# Patient Record
Sex: MALE | Race: BLACK OR AFRICAN AMERICAN | NOT HISPANIC OR LATINO | Employment: UNEMPLOYED | URBAN - METROPOLITAN AREA
[De-identification: names, ages, dates, MRNs, and addresses within clinical notes are randomized per-mention and may not be internally consistent; named-entity substitution may affect disease eponyms.]

---

## 2017-03-09 PROBLEM — R76.8 HEPATITIS C ANTIBODY POSITIVE IN BLOOD: Status: ACTIVE | Noted: 2017-03-09

## 2017-03-09 PROBLEM — F10.10 ETOH ABUSE: Status: ACTIVE | Noted: 2017-03-09

## 2017-03-09 PROBLEM — Z12.11 SCREENING FOR COLON CANCER: Status: ACTIVE | Noted: 2017-03-09

## 2017-04-11 PROBLEM — Z12.11 ENCOUNTER FOR SCREENING COLONOSCOPY: Status: ACTIVE | Noted: 2017-04-11

## 2018-04-23 ENCOUNTER — TELEPHONE (OUTPATIENT)
Dept: ADMINISTRATIVE | Facility: HOSPITAL | Age: 58
End: 2018-04-23

## 2018-04-24 ENCOUNTER — TELEPHONE (OUTPATIENT)
Dept: ADMINISTRATIVE | Facility: HOSPITAL | Age: 58
End: 2018-04-24

## 2018-09-06 PROBLEM — B18.2 CHRONIC HEPATITIS C WITHOUT HEPATIC COMA: Status: ACTIVE | Noted: 2017-03-09

## 2019-11-19 ENCOUNTER — TELEPHONE (OUTPATIENT)
Dept: PHARMACY | Facility: CLINIC | Age: 59
End: 2019-11-19

## 2019-11-19 NOTE — TELEPHONE ENCOUNTER
LVM for callback to inform patient that [facility] received prescription for Epclusa and benefits investigation is required.  [facility] will be back in touch once insurance determination is received.

## 2019-12-06 ENCOUNTER — TELEPHONE (OUTPATIENT)
Dept: PHARMACY | Facility: CLINIC | Age: 59
End: 2019-12-06

## 2019-12-06 NOTE — TELEPHONE ENCOUNTER
Initial Epclusa Consultation attempted (attempt 1). NA, LVM for call back. Will f/u if no call back.

## 2019-12-10 NOTE — TELEPHONE ENCOUNTER
Initial Epclusa Consultation attempted (attempt 2). NA, LVM for call back. Will f/u if no call back.

## 2019-12-20 NOTE — TELEPHONE ENCOUNTER
Initial Epclusa Consultation attempted (attempt 3). No answer. Called both numbers on file. LVM for call back. Will f/u if no call back.  - Will notify provider (Dorcas Brito) and mail postcard to address on file if no answer on next attempt.

## 2020-01-02 NOTE — TELEPHONE ENCOUNTER
Initial Epclusa Consultation attempted (attempt 4). NA, LVM for call back. Will f/u in 1 week if no call back.   - [facility] will send postcard to address on file and notify provider of lack of contact.

## 2020-01-09 NOTE — TELEPHONE ENCOUNTER
Initial Epclusa Consultation attempted (attempt 5). NA on both #s on file, LVM for call back. Will f/u in 1 week if no call back.   - [facility] sent postcard to address on file and notified provider of lack of contact on 1/2.

## 2020-01-16 NOTE — TELEPHONE ENCOUNTER
Initial Epclusa Consultation attempted (attempt 6). NA on both #s on file, LVM for call back.   - [facility] sent postcard to address on file and notified provider of lack of contact on 1/2.

## 2020-02-13 ENCOUNTER — TELEPHONE (OUTPATIENT)
Dept: PHARMACY | Facility: CLINIC | Age: 60
End: 2020-02-13

## 2020-02-13 NOTE — TELEPHONE ENCOUNTER
Initial AG Epclusa consult completed on . AG Epclusa will be shipped on  to arrive at patient's home on  via [employer]. $0.00 copay. Patient will start AG Epclusa on . Address confirmed. Confirmed 2 patient identifiers - name and . Therapy Appropriate.     AG Epclusa 400/100mg- Take one tablet by mouth daily x 12 weeks  Counseling was reviewed:   1. Patient MUST take AG Epclusa at the SAME time every day.   2. Patient MUST avoid acid reducers without consulting with myself or provider first. Antacids are to be spaced out at least 4 hours apart from Epclusa.     3. Potential Side effects include: headaches and fatigue.   Headache: Patient may treat with OTC remedies. If Tylenol is used, dose should not exceed 2000mg per day.    4. Medication list reviewed. No DDIs or allergies noted. Patient MUST contact myself or provider prior to starting any new OTC, herbal, or prescription drugs to avoid potential DDIs.    DDI: Medication list reviewed and potential DDIs addressed.  AG Epclusa and Lipitor: Increased risk of myalgia. Pt will monitor for new onset muscle pain/weakness. Safe to continue on Lipitor 10mg during treatment. Pt aware dose cannot be increased.     Discussed the importance of staying well hydrated while on therapy. Compliance stressed - patient to take missed doses as soon as remembered, but NOT to take 2 doses in one day. Patient will report questions or concerns to myself or practitioner. Patient verbalizes understanding. Patient plans to start AG Epclusa on .  Consultation included: indication; goals of treatment; administration; storage and handling; side effects; how to handle side effects; the importance of compliance; how to handle missed doses; the importance of laboratory monitoring; the importance of keeping all follow up appointments.  Patient understands to report any medication changes to [facility] and provider. All questions answered and addressed to patients satisfaction. I will  f/u with him in 1 week from start, [facility] to contact patient in 3 weeks for refills.

## 2020-02-26 ENCOUNTER — TELEPHONE (OUTPATIENT)
Dept: PHARMACY | Facility: CLINIC | Age: 60
End: 2020-02-26

## 2020-02-26 NOTE — TELEPHONE ENCOUNTER
Initial touch base attempted for Epclusa. NA, LVM for call back. Will f/u at refill call if no call back.

## 2020-03-10 ENCOUNTER — TELEPHONE (OUTPATIENT)
Dept: PHARMACY | Facility: CLINIC | Age: 60
End: 2020-03-10

## 2020-03-10 NOTE — TELEPHONE ENCOUNTER
Refill readiness for Epclusa confirmed with patient; name/ confirmed; no missed doses; no new medications; no side effects noted; address confirmed for 3/12 shipment and 3/13 delivery.  Patient states they have 10 doses remaining.     JOHANNE Chatman.Ph., AAHIVP  Clinical Pharmacist, HIV/HCV  [facility]  Phone: [phone]

## 2020-04-07 ENCOUNTER — TELEPHONE (OUTPATIENT)
Dept: PHARMACY | Facility: CLINIC | Age: 60
End: 2020-04-07

## 2020-04-09 NOTE — TELEPHONE ENCOUNTER
Epclusa refill (3 of 3) and reassessment attempted (attempt 2). No answer. Called both number on file. LVM for call back on both lines. Will f/u if no call back.   - Copay $0.00

## 2020-04-13 NOTE — TELEPHONE ENCOUNTER
Epclusa refill (3 of 3) and reassessment attempted (attempt 3). NA on both phone #s on file, LVM for call back on both #s. Will f/u if no call back. $0 copay.   - Patient should be out of doses today, 4/13.

## 2020-04-15 NOTE — TELEPHONE ENCOUNTER
Epclusa refill (3 of 3) and reassessment attempted (attempt 4). NA on both phone #s on file, LVM for call back on both #s. Will f/u if no call back. $0 copay.   - Patient should have run out of doses today, 4/13.   - Will notify provider and send postcard to address on file.

## 2020-04-22 NOTE — TELEPHONE ENCOUNTER
Epclusa refill (3 of 3) and reassessment attempted (attempt 5). NA, LVM for call back. Will f/u if no call back. $0 copay.   - Patient should have run out of doses on 4/13.   - Provider notified and sent postcard to address on file on 4/15.

## 2020-04-29 NOTE — TELEPHONE ENCOUNTER
Epclusa refill (3 of 3) confirmed and reassessment attempted (attempt 6). NA on both phone #s listed on file, [facility] for call back on [phone]. $0 copay.   - Patient should have run out of doses on 4/13.   - Provider notified and sent postcard to address on file on 4/15.  - Will message provider to determine how to proceed.

## 2022-07-07 DIAGNOSIS — R97.20 ELEVATED PSA: Primary | ICD-10-CM

## 2022-08-22 ENCOUNTER — OFFICE VISIT (OUTPATIENT)
Dept: UROLOGY | Facility: CLINIC | Age: 62
End: 2022-08-22
Payer: MEDICAID

## 2022-08-22 VITALS
RESPIRATION RATE: 20 BRPM | TEMPERATURE: 98 F | HEART RATE: 78 BPM | WEIGHT: 180.13 LBS | SYSTOLIC BLOOD PRESSURE: 130 MMHG | OXYGEN SATURATION: 100 % | DIASTOLIC BLOOD PRESSURE: 78 MMHG | BODY MASS INDEX: 25.79 KG/M2 | HEIGHT: 70 IN

## 2022-08-22 DIAGNOSIS — R35.1 NOCTURIA: Primary | ICD-10-CM

## 2022-08-22 DIAGNOSIS — N32.81 OVERACTIVE BLADDER: ICD-10-CM

## 2022-08-22 DIAGNOSIS — R97.20 ELEVATED PSA, LESS THAN 10 NG/ML: ICD-10-CM

## 2022-08-22 DIAGNOSIS — R97.20 ELEVATED PSA: ICD-10-CM

## 2022-08-22 LAB
APPEARANCE UR: CLEAR
BACTERIA #/AREA URNS AUTO: ABNORMAL /HPF
BILIRUB UR QL STRIP.AUTO: NEGATIVE MG/DL
COLOR UR AUTO: ABNORMAL
GLUCOSE UR QL STRIP.AUTO: NORMAL MG/DL
HYALINE CASTS #/AREA URNS LPF: ABNORMAL /LPF
KETONES UR QL STRIP.AUTO: NEGATIVE MG/DL
LEUKOCYTE ESTERASE UR QL STRIP.AUTO: NEGATIVE UNIT/L
NITRITE UR QL STRIP.AUTO: NEGATIVE
PH UR STRIP.AUTO: 6.5 [PH]
PROT UR QL STRIP.AUTO: NEGATIVE MG/DL
RBC #/AREA URNS AUTO: ABNORMAL /HPF
RBC UR QL AUTO: NEGATIVE UNIT/L
SP GR UR STRIP.AUTO: 1.02
SQUAMOUS #/AREA URNS LPF: ABNORMAL /HPF
UROBILINOGEN UR STRIP-ACNC: NORMAL MG/DL
WBC #/AREA URNS AUTO: ABNORMAL /HPF

## 2022-08-22 PROCEDURE — 99213 PR OFFICE/OUTPT VISIT, EST, LEVL III, 20-29 MIN: ICD-10-PCS | Mod: S$PBB,,, | Performed by: NURSE PRACTITIONER

## 2022-08-22 PROCEDURE — 3075F PR MOST RECENT SYSTOLIC BLOOD PRESS GE 130-139MM HG: ICD-10-PCS | Mod: CPTII,,, | Performed by: NURSE PRACTITIONER

## 2022-08-22 PROCEDURE — 99213 OFFICE O/P EST LOW 20 MIN: CPT | Mod: S$PBB,,, | Performed by: NURSE PRACTITIONER

## 2022-08-22 PROCEDURE — 1160F RVW MEDS BY RX/DR IN RCRD: CPT | Mod: CPTII,,, | Performed by: NURSE PRACTITIONER

## 2022-08-22 PROCEDURE — 1160F PR REVIEW ALL MEDS BY PRESCRIBER/CLIN PHARMACIST DOCUMENTED: ICD-10-PCS | Mod: CPTII,,, | Performed by: NURSE PRACTITIONER

## 2022-08-22 PROCEDURE — 3008F BODY MASS INDEX DOCD: CPT | Mod: CPTII,,, | Performed by: NURSE PRACTITIONER

## 2022-08-22 PROCEDURE — 81001 URINALYSIS AUTO W/SCOPE: CPT | Performed by: NURSE PRACTITIONER

## 2022-08-22 PROCEDURE — 99214 OFFICE O/P EST MOD 30 MIN: CPT | Mod: PBBFAC | Performed by: NURSE PRACTITIONER

## 2022-08-22 PROCEDURE — 1159F PR MEDICATION LIST DOCUMENTED IN MEDICAL RECORD: ICD-10-PCS | Mod: CPTII,,, | Performed by: NURSE PRACTITIONER

## 2022-08-22 PROCEDURE — 3078F DIAST BP <80 MM HG: CPT | Mod: CPTII,,, | Performed by: NURSE PRACTITIONER

## 2022-08-22 PROCEDURE — 3008F PR BODY MASS INDEX (BMI) DOCUMENTED: ICD-10-PCS | Mod: CPTII,,, | Performed by: NURSE PRACTITIONER

## 2022-08-22 PROCEDURE — 3078F PR MOST RECENT DIASTOLIC BLOOD PRESSURE < 80 MM HG: ICD-10-PCS | Mod: CPTII,,, | Performed by: NURSE PRACTITIONER

## 2022-08-22 PROCEDURE — 3075F SYST BP GE 130 - 139MM HG: CPT | Mod: CPTII,,, | Performed by: NURSE PRACTITIONER

## 2022-08-22 PROCEDURE — 1159F MED LIST DOCD IN RCRD: CPT | Mod: CPTII,,, | Performed by: NURSE PRACTITIONER

## 2022-08-22 PROCEDURE — 51798 US URINE CAPACITY MEASURE: CPT | Mod: PBBFAC | Performed by: NURSE PRACTITIONER

## 2022-08-22 RX ORDER — OXYBUTYNIN CHLORIDE 5 MG/1
5 TABLET, EXTENDED RELEASE ORAL DAILY
Qty: 30 TABLET | Refills: 11 | Status: SHIPPED | OUTPATIENT
Start: 2022-08-22 | End: 2022-10-24 | Stop reason: DRUGHIGH
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
## 2022-08-22 NOTE — PROGRESS NOTES
Placed in room. Seen by Brown Pro NP. Spoke with patient. Will obtain a U/A reflex. Obtain a PSA in 2 weeks.RTC in 2 months.

## 2022-10-24 ENCOUNTER — OFFICE VISIT (OUTPATIENT)
Dept: UROLOGY | Facility: CLINIC | Age: 62
End: 2022-10-24
Payer: MEDICAID

## 2022-10-24 VITALS
DIASTOLIC BLOOD PRESSURE: 98 MMHG | OXYGEN SATURATION: 98 % | RESPIRATION RATE: 18 BRPM | TEMPERATURE: 98 F | WEIGHT: 189 LBS | HEART RATE: 89 BPM | SYSTOLIC BLOOD PRESSURE: 163 MMHG | BODY MASS INDEX: 27.06 KG/M2 | HEIGHT: 70 IN

## 2022-10-24 DIAGNOSIS — N32.81 OVERACTIVE BLADDER: Primary | ICD-10-CM

## 2022-10-24 DIAGNOSIS — R97.20 ELEVATED PSA: ICD-10-CM

## 2022-10-24 PROCEDURE — 3077F PR MOST RECENT SYSTOLIC BLOOD PRESSURE >= 140 MM HG: ICD-10-PCS | Mod: CPTII,,, | Performed by: NURSE PRACTITIONER

## 2022-10-24 PROCEDURE — 1159F MED LIST DOCD IN RCRD: CPT | Mod: CPTII,,, | Performed by: NURSE PRACTITIONER

## 2022-10-24 PROCEDURE — 99213 OFFICE O/P EST LOW 20 MIN: CPT | Mod: PBBFAC | Performed by: NURSE PRACTITIONER

## 2022-10-24 PROCEDURE — 99213 PR OFFICE/OUTPT VISIT, EST, LEVL III, 20-29 MIN: ICD-10-PCS | Mod: S$PBB,,, | Performed by: NURSE PRACTITIONER

## 2022-10-24 PROCEDURE — 1160F PR REVIEW ALL MEDS BY PRESCRIBER/CLIN PHARMACIST DOCUMENTED: ICD-10-PCS | Mod: CPTII,,, | Performed by: NURSE PRACTITIONER

## 2022-10-24 PROCEDURE — 3077F SYST BP >= 140 MM HG: CPT | Mod: CPTII,,, | Performed by: NURSE PRACTITIONER

## 2022-10-24 PROCEDURE — 3080F DIAST BP >= 90 MM HG: CPT | Mod: CPTII,,, | Performed by: NURSE PRACTITIONER

## 2022-10-24 PROCEDURE — 99213 OFFICE O/P EST LOW 20 MIN: CPT | Mod: S$PBB,,, | Performed by: NURSE PRACTITIONER

## 2022-10-24 PROCEDURE — 1159F PR MEDICATION LIST DOCUMENTED IN MEDICAL RECORD: ICD-10-PCS | Mod: CPTII,,, | Performed by: NURSE PRACTITIONER

## 2022-10-24 PROCEDURE — 1160F RVW MEDS BY RX/DR IN RCRD: CPT | Mod: CPTII,,, | Performed by: NURSE PRACTITIONER

## 2022-10-24 PROCEDURE — 3080F PR MOST RECENT DIASTOLIC BLOOD PRESSURE >= 90 MM HG: ICD-10-PCS | Mod: CPTII,,, | Performed by: NURSE PRACTITIONER

## 2022-10-24 RX ORDER — OXYBUTYNIN CHLORIDE 10 MG/1
10 TABLET, EXTENDED RELEASE ORAL DAILY
Qty: 30 TABLET | Refills: 11 | Status: SHIPPED | OUTPATIENT
Start: 2022-10-24 | End: 2023-10-24

## 2022-10-24 NOTE — PROGRESS NOTES
Pt seen by Morteza DUFFY. Pt instructed to go to lab now to have PSA drawn. RTC 6 weeks. Pt medication of oxybutynin will be increased to 10mg. Pt declined summary at visit.
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
## 2022-10-25 ENCOUNTER — TELEPHONE (OUTPATIENT)
Dept: UROLOGY | Facility: CLINIC | Age: 62
End: 2022-10-25
Payer: MEDICAID

## 2022-10-25 NOTE — TELEPHONE ENCOUNTER
Spoke to patient via telephone regarding PSA 2.81.  I did also instructed patient to continue medicines as prescribed will hold off on prostate biopsy for now due to PSA level within normal limits.  Patient agree with plan.

## 2022-12-12 ENCOUNTER — OFFICE VISIT (OUTPATIENT)
Dept: UROLOGY | Facility: CLINIC | Age: 62
End: 2022-12-12
Payer: MEDICAID

## 2022-12-12 VITALS
HEIGHT: 70 IN | RESPIRATION RATE: 18 BRPM | TEMPERATURE: 97 F | SYSTOLIC BLOOD PRESSURE: 134 MMHG | OXYGEN SATURATION: 100 % | HEART RATE: 65 BPM | BODY MASS INDEX: 27.83 KG/M2 | WEIGHT: 194.38 LBS | DIASTOLIC BLOOD PRESSURE: 83 MMHG

## 2022-12-12 DIAGNOSIS — R35.1 BENIGN PROSTATIC HYPERPLASIA WITH NOCTURIA: Primary | ICD-10-CM

## 2022-12-12 DIAGNOSIS — N40.1 BENIGN PROSTATIC HYPERPLASIA WITH NOCTURIA: Primary | ICD-10-CM

## 2022-12-12 PROCEDURE — 1159F PR MEDICATION LIST DOCUMENTED IN MEDICAL RECORD: ICD-10-PCS | Mod: CPTII,,, | Performed by: NURSE PRACTITIONER

## 2022-12-12 PROCEDURE — 99213 PR OFFICE/OUTPT VISIT, EST, LEVL III, 20-29 MIN: ICD-10-PCS | Mod: S$PBB,,, | Performed by: NURSE PRACTITIONER

## 2022-12-12 PROCEDURE — 3008F BODY MASS INDEX DOCD: CPT | Mod: CPTII,,, | Performed by: NURSE PRACTITIONER

## 2022-12-12 PROCEDURE — 3075F SYST BP GE 130 - 139MM HG: CPT | Mod: CPTII,,, | Performed by: NURSE PRACTITIONER

## 2022-12-12 PROCEDURE — 1160F RVW MEDS BY RX/DR IN RCRD: CPT | Mod: CPTII,,, | Performed by: NURSE PRACTITIONER

## 2022-12-12 PROCEDURE — 3079F PR MOST RECENT DIASTOLIC BLOOD PRESSURE 80-89 MM HG: ICD-10-PCS | Mod: CPTII,,, | Performed by: NURSE PRACTITIONER

## 2022-12-12 PROCEDURE — 3079F DIAST BP 80-89 MM HG: CPT | Mod: CPTII,,, | Performed by: NURSE PRACTITIONER

## 2022-12-12 PROCEDURE — 3008F PR BODY MASS INDEX (BMI) DOCUMENTED: ICD-10-PCS | Mod: CPTII,,, | Performed by: NURSE PRACTITIONER

## 2022-12-12 PROCEDURE — 1159F MED LIST DOCD IN RCRD: CPT | Mod: CPTII,,, | Performed by: NURSE PRACTITIONER

## 2022-12-12 PROCEDURE — 99213 OFFICE O/P EST LOW 20 MIN: CPT | Mod: S$PBB,,, | Performed by: NURSE PRACTITIONER

## 2022-12-12 PROCEDURE — 99214 OFFICE O/P EST MOD 30 MIN: CPT | Mod: PBBFAC | Performed by: NURSE PRACTITIONER

## 2022-12-12 PROCEDURE — 1160F PR REVIEW ALL MEDS BY PRESCRIBER/CLIN PHARMACIST DOCUMENTED: ICD-10-PCS | Mod: CPTII,,, | Performed by: NURSE PRACTITIONER

## 2022-12-12 PROCEDURE — 3075F PR MOST RECENT SYSTOLIC BLOOD PRESS GE 130-139MM HG: ICD-10-PCS | Mod: CPTII,,, | Performed by: NURSE PRACTITIONER
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
## 2022-12-12 NOTE — PROGRESS NOTES
Patient seen by Maximo Pro NP and informed he will need to be seen back in clinic in 6 months for follow-up visit with PSA one week prior to visit. Patient stated 100% understanding at time of checkout.

## 2023-02-09 DIAGNOSIS — N32.81 OVERACTIVE BLADDER: Primary | ICD-10-CM

## 2023-02-09 RX ORDER — MIRABEGRON 25 MG/1
25 TABLET, FILM COATED, EXTENDED RELEASE ORAL DAILY
Qty: 30 TABLET | Refills: 11 | Status: SHIPPED | OUTPATIENT
Start: 2023-02-09 | End: 2023-02-13

## 2023-02-13 DIAGNOSIS — N32.81 OVERACTIVE BLADDER: Primary | ICD-10-CM

## 2023-02-13 RX ORDER — MIRABEGRON 25 MG/1
25 TABLET, FILM COATED, EXTENDED RELEASE ORAL DAILY
Qty: 30 TABLET | Refills: 11 | Status: SHIPPED | OUTPATIENT
Start: 2023-02-13 | End: 2024-02-13

## 2023-03-20 ENCOUNTER — TELEPHONE (OUTPATIENT)
Dept: UROLOGY | Facility: CLINIC | Age: 63
End: 2023-03-20
Payer: MEDICAID

## 2023-03-20 NOTE — TELEPHONE ENCOUNTER
Patient called to confirm appointment for Urology on 03/23/2023. Patient confirmed appointment. Patient informed of labs (PSA) that needs to be done prior to appointment. Voiced understanding.